# Patient Record
Sex: FEMALE | Race: BLACK OR AFRICAN AMERICAN | URBAN - METROPOLITAN AREA
[De-identification: names, ages, dates, MRNs, and addresses within clinical notes are randomized per-mention and may not be internally consistent; named-entity substitution may affect disease eponyms.]

---

## 2024-03-09 ENCOUNTER — HOSPITAL ENCOUNTER (OUTPATIENT)
Facility: HOSPITAL | Age: 34
Discharge: HOME | End: 2024-03-09
Attending: OBSTETRICS & GYNECOLOGY | Admitting: OBSTETRICS & GYNECOLOGY
Payer: MEDICAID

## 2024-03-09 VITALS
BODY MASS INDEX: 29.08 KG/M2 | HEART RATE: 107 BPM | TEMPERATURE: 68 F | OXYGEN SATURATION: 100 % | DIASTOLIC BLOOD PRESSURE: 77 MMHG | WEIGHT: 158 LBS | SYSTOLIC BLOOD PRESSURE: 128 MMHG | HEIGHT: 62 IN

## 2024-03-09 LAB — RUPTURE OF MEMBRANE (ROM): NEGATIVE

## 2024-03-09 PROCEDURE — 59025 FETAL NON-STRESS TEST: CPT | Performed by: OBSTETRICS & GYNECOLOGY

## 2024-03-09 RX ORDER — CHOLECALCIFEROL (VITAMIN D3) 25 MCG
1 TABLET,CHEWABLE ORAL DAILY
COMMUNITY

## 2024-03-09 RX ORDER — METRONIDAZOLE 250 MG/1
250 TABLET ORAL 3 TIMES DAILY
COMMUNITY

## 2024-03-09 NOTE — PROGRESS NOTES
with edc 24 (37 wk 2 day gestation) presents to triage 2 by ambulance with c/o srom at 0945 today, clear fluid. Denies other problems with pregnancy or med hx. Denies vag bleeding. Reports +FM. States began having mild contractions after her water broke.   EFM tested/applied/explained. Pt was receiving prenatal care and was set to deliver at [facility] but \"This was closer\".

## 2024-03-09 NOTE — NST
Nonstress Test   Patient: Cat RICK April    Gestation: 37w2d    NST:       Variability: Moderate           Accelerations: Yes           Decelerations: None            Baseline: 145 BPM           Uterine Irritability: No           Contractions: Irregular                                        Acoustic Stimulator: No           Nonstress Test Interpretation: Reactive           Nonstress Test Second Interpretation: Reactive                     Additional Comments

## 2024-03-09 NOTE — PROGRESS NOTES
Pt  is currently taking flagyl for Trich and had sexual intercourse at 0700 today.  LOF clear began at 0945, dripped down her leg. States voided in BR and hasn't had much leaking since then.

## 2024-03-09 NOTE — PROGRESS NOTES
FETAL NONSTRESS TEST:  Baseline FHR: 130 per minute  Fetal heart variability: moderate  Fetal Heart Rate accelerations: 15x15   Fetal Heart Rate declerations: none  Tracing category 1  Fetal Non-stress Test Interpretation: reactive    Iglesia Spain MD  [facility]- Obstetrics & Gynecology

## 2024-03-09 NOTE — DISCHARGE INSTRUCTIONS
Discharge Instructions    Diet: Regular  Activity: Normal activity         General Instructions    Call your OB doctor if: Fluid leaking from your vagina;Decrease in fetal movement;Vaginal or rectal pressure;Uterine contractions 10 minutes or closer for 1 to 2 hours;Vaginal bleeding;Uterine contractions increasing in intensity and frequency;Temperature greater than 100F  Early labor comfort measures: Take a walk;Relax, sleep, take a warm bath or shower for 30 minutes or less;Drink fluids and eat small light meals      Follow up with your OB from [facility] as previously scheduled.

## 2024-03-09 NOTE — PROGRESS NOTES
Discharge instructions discussed with pt, pt verb understanding and receives copy of instructions. States will keep next OB appointment this Thursday and that she just spoke on the phone with her OB. Pt refuses w/c escort out of unit. Discharged home, stable and undelivered, with mother.